# Patient Record
Sex: FEMALE | URBAN - METROPOLITAN AREA
[De-identification: names, ages, dates, MRNs, and addresses within clinical notes are randomized per-mention and may not be internally consistent; named-entity substitution may affect disease eponyms.]

---

## 2021-02-17 ENCOUNTER — OFFICE VISIT (OUTPATIENT)
Dept: URBAN - METROPOLITAN AREA CLINIC 113 | Facility: CLINIC | Age: 51
End: 2021-02-17

## 2021-02-17 NOTE — HPI-TODAY'S VISIT:
51-year-old woman referred by Dr. Gomez for evaluation of weight loss and fatigue.    Chest x-ray 1/13/21: Negative Abdominal ultrasound 1/13/21: Normal Labs 11/11/20: WBC 5.9, hemoglobin 11.8, hematocrit 34.3, MCV 93, platelets 290, BUN 11, creatinine 0.98, sodium 142, potassium 3.8, calcium 9.1, T bili less than 0.2, , AST 26, ALT 20, TSH 4.8, hepatitis C virus antibody negative, CEA normal

## 2021-04-14 ENCOUNTER — LAB OUTSIDE AN ENCOUNTER (OUTPATIENT)
Dept: URBAN - METROPOLITAN AREA CLINIC 113 | Facility: CLINIC | Age: 51
End: 2021-04-14

## 2021-04-14 ENCOUNTER — DASHBOARD ENCOUNTERS (OUTPATIENT)
Age: 51
End: 2021-04-14

## 2021-04-14 ENCOUNTER — WEB ENCOUNTER (OUTPATIENT)
Dept: URBAN - METROPOLITAN AREA CLINIC 113 | Facility: CLINIC | Age: 51
End: 2021-04-14

## 2021-04-14 ENCOUNTER — OFFICE VISIT (OUTPATIENT)
Dept: URBAN - METROPOLITAN AREA CLINIC 113 | Facility: CLINIC | Age: 51
End: 2021-04-14
Payer: COMMERCIAL

## 2021-04-14 VITALS
WEIGHT: 120 LBS | BODY MASS INDEX: 21.26 KG/M2 | HEART RATE: 78 BPM | RESPIRATION RATE: 18 BRPM | TEMPERATURE: 98.2 F | HEIGHT: 63 IN | SYSTOLIC BLOOD PRESSURE: 106 MMHG | DIASTOLIC BLOOD PRESSURE: 68 MMHG

## 2021-04-14 DIAGNOSIS — R13.12 OROPHARYNGEAL DYSPHAGIA: ICD-10-CM

## 2021-04-14 DIAGNOSIS — R63.4 WEIGHT LOSS: ICD-10-CM

## 2021-04-14 DIAGNOSIS — K59.01 SLOW TRANSIT CONSTIPATION: ICD-10-CM

## 2021-04-14 DIAGNOSIS — R10.32 LEFT LOWER QUADRANT ABDOMINAL PAIN: ICD-10-CM

## 2021-04-14 PROBLEM — 35298007: Status: ACTIVE | Noted: 2021-04-14

## 2021-04-14 PROCEDURE — 99203 OFFICE O/P NEW LOW 30 MIN: CPT | Performed by: PHYSICIAN ASSISTANT

## 2021-04-14 PROCEDURE — 74177 CT ABD & PELVIS W/CONTRAST: CPT | Performed by: PHYSICIAN ASSISTANT

## 2021-04-14 RX ORDER — LEVOTHYROXINE SODIUM 0.15 MG/1
1 TABLET IN THE MORNING ON AN EMPTY STOMACH TABLET ORAL ONCE A DAY
Status: ACTIVE | COMMUNITY

## 2021-04-14 RX ORDER — MELOXICAM 7.5 MG/1
1 TABLET TABLET ORAL ONCE A DAY
Status: ACTIVE | COMMUNITY

## 2021-04-14 RX ORDER — DULOXETINE HYDROCHLORIDE 30 MG/1
1 CAPSULE CAPSULE, DELAYED RELEASE ORAL ONCE A DAY
Status: ACTIVE | COMMUNITY

## 2021-04-14 RX ORDER — LINACLOTIDE 72 UG/1
1 CAPSULE AT LEAST 30 MINUTES BEFORE THE FIRST MEAL OF THE DAY ON AN EMPTY STOMACH CAPSULE, GELATIN COATED ORAL ONCE A DAY
Qty: 60 | Refills: 3 | OUTPATIENT
Start: 2021-04-14 | End: 2021-12-09

## 2021-04-14 NOTE — PHYSICAL EXAM GASTROINTESTINAL
Abdomen,  soft, mild left lower quadrant abdominal pain upon palpation, nondistended,  no guarding or rigidity,  no masses palpable,  normal bowel sounds

## 2021-04-14 NOTE — HPI-TODAY'S VISIT:
Ms. Suarez is a 51-year-old with a history of chronic constipation, referred by Dr. Jackson, who presents for evaluation of weight loss.  A copy of this document will be sent to the referring provider.  She reports a 30 lb unintentional weight loss over the past 5-6 months. She was evaluated by her primary care provider on multiple occasions for this. Work up revealed negative Hepatitis C Ab (11/11/2020), negative Caricinoembryonic-Antigen (1/4/2021), and unremarkable abdominal ultrasound on 1/13/2021. She also reports dysphagia with solid foods such pasta, rice, and dry meats. She describes a sensation of food becoming hung at the level of the sternal notch and "collecting" in her throat, requiring self-induced vomiting. She reports a decreased appetite secondary to fears of nausea and vomiting. She also reports left lower quadrant abdominal pain. She describes the pain as a dull ache. She has not had a "normal" bowel movement in 17 days despite multiple over the counter laxatives including milk of magnesia, MiraLAX, and Jiménez suppositories. She describes her stools as "hard golf balls" when she is able to have a bowel movement. Denies red blood per rectum, melena or hematochezia. She has never had a colonoscopy in the past. There is no known family history of GI malignancy. She reports chronic meloxicam use secondary to chronic pain.

## 2021-04-16 PROBLEM — 71457002: Status: ACTIVE | Noted: 2021-04-14

## 2021-04-26 ENCOUNTER — TELEPHONE ENCOUNTER (OUTPATIENT)
Dept: URBAN - METROPOLITAN AREA CLINIC 113 | Facility: CLINIC | Age: 51
End: 2021-04-26

## 2021-05-03 ENCOUNTER — OFFICE VISIT (OUTPATIENT)
Dept: URBAN - METROPOLITAN AREA SURGERY CENTER 25 | Facility: SURGERY CENTER | Age: 51
End: 2021-05-03

## 2021-05-03 ENCOUNTER — TELEPHONE ENCOUNTER (OUTPATIENT)
Dept: URBAN - METROPOLITAN AREA CLINIC 113 | Facility: CLINIC | Age: 51
End: 2021-05-03

## 2021-05-03 RX ORDER — DULOXETINE HYDROCHLORIDE 30 MG/1
1 CAPSULE CAPSULE, DELAYED RELEASE ORAL ONCE A DAY
Status: ACTIVE | COMMUNITY

## 2021-05-03 RX ORDER — MELOXICAM 7.5 MG/1
1 TABLET TABLET ORAL ONCE A DAY
Status: ACTIVE | COMMUNITY

## 2021-05-03 RX ORDER — LINACLOTIDE 72 UG/1
1 CAPSULE AT LEAST 30 MINUTES BEFORE THE FIRST MEAL OF THE DAY ON AN EMPTY STOMACH CAPSULE, GELATIN COATED ORAL ONCE A DAY
Qty: 60 | Refills: 3 | Status: ACTIVE | COMMUNITY
Start: 2021-04-14 | End: 2021-12-09

## 2021-05-03 RX ORDER — LEVOTHYROXINE SODIUM 0.15 MG/1
1 TABLET IN THE MORNING ON AN EMPTY STOMACH TABLET ORAL ONCE A DAY
Status: ACTIVE | COMMUNITY

## 2021-05-10 ENCOUNTER — OFFICE VISIT (OUTPATIENT)
Dept: URBAN - METROPOLITAN AREA CLINIC 113 | Facility: CLINIC | Age: 51
End: 2021-05-10